# Patient Record
Sex: FEMALE | Race: WHITE | Employment: OTHER | URBAN - METROPOLITAN AREA
[De-identification: names, ages, dates, MRNs, and addresses within clinical notes are randomized per-mention and may not be internally consistent; named-entity substitution may affect disease eponyms.]

---

## 2017-02-05 ENCOUNTER — APPOINTMENT (OUTPATIENT)
Dept: GENERAL RADIOLOGY | Facility: HOSPITAL | Age: 82
End: 2017-02-05
Payer: MEDICARE

## 2017-02-05 ENCOUNTER — HOSPITAL ENCOUNTER (EMERGENCY)
Facility: HOSPITAL | Age: 82
Discharge: HOME | End: 2017-02-05
Payer: MEDICARE

## 2017-02-05 VITALS
WEIGHT: 118 LBS | OXYGEN SATURATION: 96 % | DIASTOLIC BLOOD PRESSURE: 60 MMHG | RESPIRATION RATE: 16 BRPM | HEIGHT: 60 IN | BODY MASS INDEX: 23.16 KG/M2 | SYSTOLIC BLOOD PRESSURE: 134 MMHG | HEART RATE: 65 BPM | TEMPERATURE: 99 F

## 2017-02-05 DIAGNOSIS — S63.502A WRIST SPRAIN, LEFT, INITIAL ENCOUNTER: Primary | ICD-10-CM

## 2017-02-05 DIAGNOSIS — S63.642A SPRAIN OF METACARPOPHALANGEAL (MCP) JOINT OF LEFT THUMB, INITIAL ENCOUNTER: ICD-10-CM

## 2017-02-05 PROCEDURE — 73130 X-RAY EXAM OF HAND: CPT

## 2017-02-05 PROCEDURE — 99283 EMERGENCY DEPT VISIT LOW MDM: CPT

## 2017-02-05 PROCEDURE — 73110 X-RAY EXAM OF WRIST: CPT

## 2017-02-05 NOTE — ED PROVIDER NOTES
Patient Seen in: [facility] AND [facility] Emergency Department    History   Patient presents with:  Musculoskeletal Problem    Stated Complaint: pain and swelling to left hand down l forearm.  injury after removing socks    HPI    70-year-old female brought by olya Neck supple. Cardiovascular: Normal rate, regular rhythm and intact distal pulses. Pulmonary/Chest: Effort normal. No respiratory distress. Abdominal: Soft.   Grossly visually unremarkable exam.  Musculoskeletal: Some mild edema with a small amount o MD on 2/05/2017 at 13:41          2/5/2017  CONCLUSION:  1. No acute fracture or subluxation. 2. 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  3. Additional non-inflammatory osteoarthritis of hand and wr

## 2017-02-05 NOTE — ED NOTES
PT safe to DC home per MD. [place] to dress self. DC teaching done, pt verbalizes understanding. Ambulatory with steady gait to exit.

## 2017-09-05 ENCOUNTER — OFFICE VISIT (OUTPATIENT)
Dept: FAMILY MEDICINE CLINIC | Facility: CLINIC | Age: 82
End: 2017-09-05

## 2017-09-05 VITALS
OXYGEN SATURATION: 95 % | HEART RATE: 80 BPM | TEMPERATURE: 99 F | SYSTOLIC BLOOD PRESSURE: 118 MMHG | RESPIRATION RATE: 18 BRPM | DIASTOLIC BLOOD PRESSURE: 60 MMHG

## 2017-09-05 DIAGNOSIS — J22 ACUTE RESPIRATORY INFECTION: Primary | ICD-10-CM

## 2017-09-05 PROCEDURE — 99202 OFFICE O/P NEW SF 15 MIN: CPT | Performed by: NURSE PRACTITIONER

## 2017-09-05 RX ORDER — AMOXICILLIN AND CLAVULANATE POTASSIUM 875; 125 MG/1; MG/1
1 TABLET, FILM COATED ORAL 2 TIMES DAILY
Qty: 20 TABLET | Refills: 0 | Status: SHIPPED | OUTPATIENT
Start: 2017-09-05 | End: 2017-09-15

## 2017-09-05 NOTE — PATIENT INSTRUCTIONS
What is Pneumonia? Pneumonia is a serious lung infection. Many cases of pneumonia are caused by bacteria or viruses.  Other less common causes include:  · Fungi  · Chemicals  · Gases    You may also get pneumonia after another illness, such as a cold, fl © 5353-3151 [address], [address] [place]. All rights reserved. This information is not intended as a substitute for professional medical care. Always follow your healthcare professional's instructions.

## 2017-09-05 NOTE — PROGRESS NOTES
CHIEF COMPLAINT:   Patient presents with:  Cough        HPI:   Iona Koenig is a 80year old female presents for cough, post nasal drainage, mild headache x3 day duration. Dry cough and laryngitis. Fatigue. No measured fevers.  Per daughter Debi Herrera) sx se Rashmi Sullivan is a 80year old female who presents with:     ASSESSMENT:  Acute respiratory infection  (primary encounter diagnosis)    PLAN:  Meds as below. Comfort Care as listed in Patient Instructions.   Decided to treat based upon presentation, low g What are the Symptoms? Symptoms of pneumonia can come without warning. At first, you may think you have a cold or flu. But symptoms may get worse quickly, turning into pneumonia. Symyptoms can be different for bacterial and viral pneumonia.  Common symptom

## 2017-09-12 ENCOUNTER — TELEPHONE (OUTPATIENT)
Dept: FAMILY MEDICINE CLINIC | Facility: CLINIC | Age: 82
End: 2017-09-12

## 2017-09-12 NOTE — TELEPHONE ENCOUNTER
[address] J[address] visit follow up. Daughter Sang Garcia reports mom is not improving on Augmenting, states still has a cough with otc not helping. Advised to follow up with PCP today or go to ER for further management.   May need chest xray and better monitoring due to her

## 2018-01-01 ENCOUNTER — APPOINTMENT (OUTPATIENT)
Dept: CT IMAGING | Facility: HOSPITAL | Age: 83
DRG: 689 | End: 2018-01-01
Attending: HOSPITALIST
Payer: MEDICARE

## 2018-01-01 ENCOUNTER — APPOINTMENT (OUTPATIENT)
Dept: RADIATION ONCOLOGY | Facility: HOSPITAL | Age: 83
End: 2018-01-01
Attending: RADIOLOGY
Payer: MEDICARE

## 2018-01-01 ENCOUNTER — APPOINTMENT (OUTPATIENT)
Dept: GENERAL RADIOLOGY | Facility: HOSPITAL | Age: 83
DRG: 689 | End: 2018-01-01
Attending: HOSPITALIST
Payer: MEDICARE

## 2018-01-01 ENCOUNTER — APPOINTMENT (OUTPATIENT)
Dept: CV DIAGNOSTICS | Facility: HOSPITAL | Age: 83
DRG: 689 | End: 2018-01-01
Attending: HOSPITALIST
Payer: MEDICARE

## 2018-01-01 ENCOUNTER — HOSPITAL ENCOUNTER (INPATIENT)
Facility: HOSPITAL | Age: 83
LOS: 8 days | Discharge: HOME HEALTH | DRG: 689 | End: 2018-01-01
Attending: EMERGENCY MEDICINE | Admitting: HOSPITALIST
Payer: MEDICARE

## 2018-01-01 ENCOUNTER — TELEPHONE (OUTPATIENT)
Dept: RADIATION ONCOLOGY | Facility: HOSPITAL | Age: 83
End: 2018-01-01

## 2018-01-01 ENCOUNTER — HOSPITAL ENCOUNTER (INPATIENT)
Facility: HOSPITAL | Age: 83
LOS: 7 days | Discharge: HOME HEALTH | DRG: 374 | End: 2018-01-01
Attending: EMERGENCY MEDICINE | Admitting: HOSPITALIST
Payer: MEDICARE

## 2018-01-01 ENCOUNTER — APPOINTMENT (OUTPATIENT)
Dept: CT IMAGING | Facility: HOSPITAL | Age: 83
DRG: 374 | End: 2018-01-01
Attending: EMERGENCY MEDICINE
Payer: MEDICARE

## 2018-01-01 ENCOUNTER — HOSPITAL ENCOUNTER (OUTPATIENT)
Dept: GENERAL RADIOLOGY | Age: 83
Discharge: HOME | End: 2018-01-01
Attending: INTERNAL MEDICINE
Payer: MEDICARE

## 2018-01-01 ENCOUNTER — APPOINTMENT (OUTPATIENT)
Dept: CT IMAGING | Facility: HOSPITAL | Age: 83
DRG: 374 | End: 2018-01-01
Attending: HOSPITALIST
Payer: MEDICARE

## 2018-01-01 ENCOUNTER — ANESTHESIA EVENT (OUTPATIENT)
Dept: ENDOSCOPY | Facility: HOSPITAL | Age: 83
DRG: 374 | End: 2018-01-01
Payer: MEDICARE

## 2018-01-01 ENCOUNTER — DIETICIAN VISIT (OUTPATIENT)
Dept: NUTRITION | Facility: HOSPITAL | Age: 83
End: 2018-01-01

## 2018-01-01 ENCOUNTER — ANESTHESIA (OUTPATIENT)
Dept: ENDOSCOPY | Facility: HOSPITAL | Age: 83
DRG: 374 | End: 2018-01-01
Payer: MEDICARE

## 2018-01-01 ENCOUNTER — TELEPHONE (OUTPATIENT)
Dept: HEMATOLOGY/ONCOLOGY | Facility: HOSPITAL | Age: 83
End: 2018-01-01

## 2018-01-01 ENCOUNTER — APPOINTMENT (OUTPATIENT)
Dept: GENERAL RADIOLOGY | Facility: HOSPITAL | Age: 83
DRG: 689 | End: 2018-01-01
Attending: EMERGENCY MEDICINE
Payer: MEDICARE

## 2018-01-01 VITALS
TEMPERATURE: 98 F | WEIGHT: 105 LBS | HEART RATE: 86 BPM | BODY MASS INDEX: 20.62 KG/M2 | OXYGEN SATURATION: 95 % | HEIGHT: 60 IN | RESPIRATION RATE: 18 BRPM | SYSTOLIC BLOOD PRESSURE: 142 MMHG | DIASTOLIC BLOOD PRESSURE: 61 MMHG

## 2018-01-01 VITALS
WEIGHT: 100.38 LBS | SYSTOLIC BLOOD PRESSURE: 124 MMHG | OXYGEN SATURATION: 96 % | BODY MASS INDEX: 23.23 KG/M2 | HEART RATE: 94 BPM | TEMPERATURE: 100 F | RESPIRATION RATE: 16 BRPM | DIASTOLIC BLOOD PRESSURE: 43 MMHG | HEIGHT: 55 IN

## 2018-01-01 DIAGNOSIS — N30.00 ACUTE CYSTITIS WITHOUT HEMATURIA: Primary | ICD-10-CM

## 2018-01-01 DIAGNOSIS — K63.1 PERFORATION BOWEL (HCC): Primary | ICD-10-CM

## 2018-01-01 DIAGNOSIS — K59.00 CONSTIPATION: ICD-10-CM

## 2018-01-01 DIAGNOSIS — C20 RECTAL CANCER (HCC): Primary | ICD-10-CM

## 2018-01-01 DIAGNOSIS — R55 SYNCOPE, NEAR: ICD-10-CM

## 2018-01-01 DIAGNOSIS — K62.89 RECTAL MASS: ICD-10-CM

## 2018-01-01 PROCEDURE — 99233 SBSQ HOSP IP/OBS HIGH 50: CPT | Performed by: HOSPITALIST

## 2018-01-01 PROCEDURE — 74018 RADEX ABDOMEN 1 VIEW: CPT | Performed by: INTERNAL MEDICINE

## 2018-01-01 PROCEDURE — 77412 RADIATION TX DELIVERY LVL 3: CPT | Performed by: RADIOLOGY

## 2018-01-01 PROCEDURE — 77280 THER RAD SIMULAJ FIELD SMPL: CPT | Performed by: RADIOLOGY

## 2018-01-01 PROCEDURE — 99223 1ST HOSP IP/OBS HIGH 75: CPT | Performed by: HOSPITALIST

## 2018-01-01 PROCEDURE — 93306 TTE W/DOPPLER COMPLETE: CPT | Performed by: HOSPITALIST

## 2018-01-01 PROCEDURE — 70450 CT HEAD/BRAIN W/O DYE: CPT | Performed by: HOSPITALIST

## 2018-01-01 PROCEDURE — 77336 RADIATION PHYSICS CONSULT: CPT | Performed by: RADIOLOGY

## 2018-01-01 PROCEDURE — 71045 X-RAY EXAM CHEST 1 VIEW: CPT | Performed by: HOSPITALIST

## 2018-01-01 PROCEDURE — 77334 RADIATION TREATMENT AID(S): CPT | Performed by: RADIOLOGY

## 2018-01-01 PROCEDURE — 99232 SBSQ HOSP IP/OBS MODERATE 35: CPT | Performed by: INTERNAL MEDICINE

## 2018-01-01 PROCEDURE — 77387 GUIDANCE FOR RADJ TX DLVR: CPT | Performed by: RADIOLOGY

## 2018-01-01 PROCEDURE — 71260 CT THORAX DX C+: CPT | Performed by: HOSPITALIST

## 2018-01-01 PROCEDURE — 77331 SPECIAL RADIATION DOSIMETRY: CPT | Performed by: RADIOLOGY

## 2018-01-01 PROCEDURE — 74177 CT ABD & PELVIS W/CONTRAST: CPT | Performed by: EMERGENCY MEDICINE

## 2018-01-01 PROCEDURE — 77290 THER RAD SIMULAJ FIELD CPLX: CPT | Performed by: RADIOLOGY

## 2018-01-01 PROCEDURE — 77295 3-D RADIOTHERAPY PLAN: CPT | Performed by: RADIOLOGY

## 2018-01-01 PROCEDURE — 99222 1ST HOSP IP/OBS MODERATE 55: CPT | Performed by: INTERNAL MEDICINE

## 2018-01-01 PROCEDURE — 99239 HOSP IP/OBS DSCHRG MGMT >30: CPT | Performed by: HOSPITALIST

## 2018-01-01 PROCEDURE — 71045 X-RAY EXAM CHEST 1 VIEW: CPT | Performed by: EMERGENCY MEDICINE

## 2018-01-01 PROCEDURE — 99223 1ST HOSP IP/OBS HIGH 75: CPT | Performed by: OTHER

## 2018-01-01 PROCEDURE — 99232 SBSQ HOSP IP/OBS MODERATE 35: CPT | Performed by: HOSPITALIST

## 2018-01-01 PROCEDURE — 99231 SBSQ HOSP IP/OBS SF/LOW 25: CPT | Performed by: OTHER

## 2018-01-01 PROCEDURE — 45331 SIGMOIDOSCOPY AND BIOPSY: CPT | Performed by: INTERNAL MEDICINE

## 2018-01-01 PROCEDURE — 77300 RADIATION THERAPY DOSE PLAN: CPT | Performed by: RADIOLOGY

## 2018-01-01 PROCEDURE — 0DBP8ZX EXCISION OF RECTUM, VIA NATURAL OR ARTIFICIAL OPENING ENDOSCOPIC, DIAGNOSTIC: ICD-10-PCS | Performed by: INTERNAL MEDICINE

## 2018-01-01 RX ORDER — BISACODYL 10 MG
10 SUPPOSITORY, RECTAL RECTAL
Status: DISCONTINUED | OUTPATIENT
Start: 2018-01-01 | End: 2018-01-01

## 2018-01-01 RX ORDER — MECLIZINE HCL 12.5 MG/1
12.5 TABLET ORAL 3 TIMES DAILY
Status: DISCONTINUED | OUTPATIENT
Start: 2018-01-01 | End: 2018-01-01

## 2018-01-01 RX ORDER — SODIUM CHLORIDE, SODIUM LACTATE, POTASSIUM CHLORIDE, CALCIUM CHLORIDE 600; 310; 30; 20 MG/100ML; MG/100ML; MG/100ML; MG/100ML
INJECTION, SOLUTION INTRAVENOUS CONTINUOUS
Status: DISCONTINUED | OUTPATIENT
Start: 2018-01-01 | End: 2018-01-01

## 2018-01-01 RX ORDER — CHOLECALCIFEROL (VITAMIN D3) 125 MCG
1000 CAPSULE ORAL DAILY
Status: DISCONTINUED | OUTPATIENT
Start: 2018-01-01 | End: 2018-01-01

## 2018-01-01 RX ORDER — HYDROCODONE BITARTRATE AND ACETAMINOPHEN 5; 325 MG/1; MG/1
1 TABLET ORAL EVERY 4 HOURS PRN
Status: DISCONTINUED | OUTPATIENT
Start: 2018-01-01 | End: 2018-01-01

## 2018-01-01 RX ORDER — SODIUM CHLORIDE 9 MG/ML
INJECTION, SOLUTION INTRAVENOUS
Status: COMPLETED
Start: 2018-01-01 | End: 2018-01-01

## 2018-01-01 RX ORDER — MAGNESIUM OXIDE 400 MG (241.3 MG MAGNESIUM) TABLET
400 TABLET ONCE
Status: COMPLETED | OUTPATIENT
Start: 2018-01-01 | End: 2018-01-01

## 2018-01-01 RX ORDER — NITROGLYCERIN 0.4 MG/1
0.4 TABLET SUBLINGUAL EVERY 5 MIN PRN
Status: DISCONTINUED | OUTPATIENT
Start: 2018-01-01 | End: 2018-01-01

## 2018-01-01 RX ORDER — HYDROCODONE BITARTRATE AND ACETAMINOPHEN 5; 325 MG/1; MG/1
2 TABLET ORAL EVERY 4 HOURS PRN
Status: DISCONTINUED | OUTPATIENT
Start: 2018-01-01 | End: 2018-01-01

## 2018-01-01 RX ORDER — SODIUM CHLORIDE 9 MG/ML
INJECTION, SOLUTION INTRAVENOUS CONTINUOUS
Status: CANCELLED | OUTPATIENT
Start: 2018-01-01

## 2018-01-01 RX ORDER — ONDANSETRON 2 MG/ML
4 INJECTION INTRAMUSCULAR; INTRAVENOUS EVERY 6 HOURS PRN
Status: DISCONTINUED | OUTPATIENT
Start: 2018-01-01 | End: 2018-01-01

## 2018-01-01 RX ORDER — MELATONIN
325
Qty: 30 TABLET | Refills: 0 | Status: SHIPPED | OUTPATIENT
Start: 2018-01-01

## 2018-01-01 RX ORDER — HEPARIN SODIUM 5000 [USP'U]/ML
5000 INJECTION, SOLUTION INTRAVENOUS; SUBCUTANEOUS EVERY 8 HOURS SCHEDULED
Status: DISCONTINUED | OUTPATIENT
Start: 2018-01-01 | End: 2018-01-01

## 2018-01-01 RX ORDER — MORPHINE SULFATE 4 MG/ML
2 INJECTION, SOLUTION INTRAMUSCULAR; INTRAVENOUS EVERY 2 HOUR PRN
Status: DISCONTINUED | OUTPATIENT
Start: 2018-01-01 | End: 2018-01-01

## 2018-01-01 RX ORDER — DEXTROSE, SODIUM CHLORIDE, AND POTASSIUM CHLORIDE 5; .45; .15 G/100ML; G/100ML; G/100ML
INJECTION INTRAVENOUS CONTINUOUS
Status: DISCONTINUED | OUTPATIENT
Start: 2018-01-01 | End: 2018-01-01

## 2018-01-01 RX ORDER — ACETAMINOPHEN 325 MG/1
650 TABLET ORAL EVERY 4 HOURS PRN
Status: DISCONTINUED | OUTPATIENT
Start: 2018-01-01 | End: 2018-01-01

## 2018-01-01 RX ORDER — SODIUM CHLORIDE 9 MG/ML
INJECTION, SOLUTION INTRAVENOUS CONTINUOUS
Status: DISCONTINUED | OUTPATIENT
Start: 2018-01-01 | End: 2018-01-01

## 2018-01-01 RX ORDER — FUROSEMIDE 10 MG/ML
40 INJECTION INTRAMUSCULAR; INTRAVENOUS ONCE
Status: COMPLETED | OUTPATIENT
Start: 2018-01-01 | End: 2018-01-01

## 2018-01-01 RX ORDER — AZITHROMYCIN 250 MG/1
500 TABLET, FILM COATED ORAL
Status: COMPLETED | OUTPATIENT
Start: 2018-01-01 | End: 2018-01-01

## 2018-01-01 RX ORDER — ACETAMINOPHEN 325 MG/1
650 TABLET ORAL EVERY 6 HOURS PRN
Status: DISCONTINUED | OUTPATIENT
Start: 2018-01-01 | End: 2018-01-01

## 2018-01-01 RX ORDER — IPRATROPIUM BROMIDE AND ALBUTEROL SULFATE 2.5; .5 MG/3ML; MG/3ML
3 SOLUTION RESPIRATORY (INHALATION) EVERY 6 HOURS PRN
Status: DISCONTINUED | OUTPATIENT
Start: 2018-01-01 | End: 2018-01-01

## 2018-01-01 RX ORDER — METOCLOPRAMIDE HYDROCHLORIDE 5 MG/ML
10 INJECTION INTRAMUSCULAR; INTRAVENOUS EVERY 8 HOURS PRN
Status: DISCONTINUED | OUTPATIENT
Start: 2018-01-01 | End: 2018-01-01

## 2018-01-01 RX ORDER — POTASSIUM CHLORIDE 20 MEQ/1
40 TABLET, EXTENDED RELEASE ORAL EVERY 4 HOURS
Status: DISCONTINUED | OUTPATIENT
Start: 2018-01-01 | End: 2018-01-01

## 2018-01-01 RX ORDER — MAGNESIUM SULFATE 1 G/100ML
1 INJECTION INTRAVENOUS ONCE
Status: COMPLETED | OUTPATIENT
Start: 2018-01-01 | End: 2018-01-01

## 2018-01-01 RX ORDER — MAGNESIUM OXIDE 400 MG (241.3 MG MAGNESIUM) TABLET
800 TABLET ONCE
Status: COMPLETED | OUTPATIENT
Start: 2018-01-01 | End: 2018-01-01

## 2018-01-01 RX ORDER — LIDOCAINE HYDROCHLORIDE 10 MG/ML
INJECTION, SOLUTION EPIDURAL; INFILTRATION; INTRACAUDAL; PERINEURAL AS NEEDED
Status: DISCONTINUED | OUTPATIENT
Start: 2018-01-01 | End: 2018-01-01 | Stop reason: SURG

## 2018-01-01 RX ORDER — 0.9 % SODIUM CHLORIDE 0.9 %
VIAL (ML) INJECTION
Status: COMPLETED
Start: 2018-01-01 | End: 2018-01-01

## 2018-01-01 RX ORDER — SPIRONOLACTONE 25 MG/1
12.5 TABLET ORAL DAILY
Qty: 30 TABLET | Refills: 0 | Status: SHIPPED | OUTPATIENT
Start: 2018-01-01

## 2018-01-01 RX ORDER — CIPROFLOXACIN 250 MG/1
250 TABLET, FILM COATED ORAL 2 TIMES DAILY
Qty: 6 TABLET | Refills: 0 | Status: SHIPPED | OUTPATIENT
Start: 2018-01-01 | End: 2018-01-01

## 2018-01-01 RX ORDER — ACETAMINOPHEN 160 MG
2000 TABLET,DISINTEGRATING ORAL DAILY
COMMUNITY

## 2018-01-01 RX ORDER — SODIUM CHLORIDE 0.9 % (FLUSH) 0.9 %
3 SYRINGE (ML) INJECTION AS NEEDED
Status: DISCONTINUED | OUTPATIENT
Start: 2018-01-01 | End: 2018-01-01

## 2018-01-01 RX ORDER — POTASSIUM CHLORIDE 20 MEQ/1
40 TABLET, EXTENDED RELEASE ORAL ONCE
Status: COMPLETED | OUTPATIENT
Start: 2018-01-01 | End: 2018-01-01

## 2018-01-01 RX ORDER — POTASSIUM CHLORIDE 20 MEQ/1
40 TABLET, EXTENDED RELEASE ORAL EVERY 4 HOURS
Status: COMPLETED | OUTPATIENT
Start: 2018-01-01 | End: 2018-01-01

## 2018-01-01 RX ORDER — SPIRONOLACTONE 25 MG/1
12.5 TABLET ORAL DAILY
Status: DISCONTINUED | OUTPATIENT
Start: 2018-01-01 | End: 2018-01-01

## 2018-01-01 RX ORDER — AZITHROMYCIN 250 MG/1
500 TABLET, FILM COATED ORAL
Status: DISCONTINUED | OUTPATIENT
Start: 2018-01-01 | End: 2018-01-01

## 2018-01-01 RX ORDER — HEPARIN SODIUM 5000 [USP'U]/ML
5000 INJECTION, SOLUTION INTRAVENOUS; SUBCUTANEOUS EVERY 12 HOURS SCHEDULED
Status: DISCONTINUED | OUTPATIENT
Start: 2018-01-01 | End: 2018-01-01

## 2018-01-01 RX ORDER — MORPHINE SULFATE 4 MG/ML
4 INJECTION, SOLUTION INTRAMUSCULAR; INTRAVENOUS EVERY 2 HOUR PRN
Status: DISCONTINUED | OUTPATIENT
Start: 2018-01-01 | End: 2018-01-01

## 2018-01-01 RX ORDER — FUROSEMIDE 20 MG/1
20 TABLET ORAL DAILY
Status: DISCONTINUED | OUTPATIENT
Start: 2018-01-01 | End: 2018-01-01

## 2018-01-01 RX ORDER — MORPHINE SULFATE 4 MG/ML
1 INJECTION, SOLUTION INTRAMUSCULAR; INTRAVENOUS EVERY 2 HOUR PRN
Status: DISCONTINUED | OUTPATIENT
Start: 2018-01-01 | End: 2018-01-01

## 2018-01-01 RX ORDER — ARIPIPRAZOLE 15 MG/1
40 TABLET ORAL ONCE
Status: DISCONTINUED | OUTPATIENT
Start: 2018-01-01 | End: 2018-01-01

## 2018-01-01 RX ORDER — MECLIZINE HCL 12.5 MG/1
12.5 TABLET ORAL 3 TIMES DAILY
Qty: 30 TABLET | Refills: 0 | Status: SHIPPED | OUTPATIENT
Start: 2018-01-01

## 2018-01-01 RX ORDER — SODIUM CHLORIDE, SODIUM LACTATE, POTASSIUM CHLORIDE, CALCIUM CHLORIDE 600; 310; 30; 20 MG/100ML; MG/100ML; MG/100ML; MG/100ML
INJECTION, SOLUTION INTRAVENOUS CONTINUOUS PRN
Status: DISCONTINUED | OUTPATIENT
Start: 2018-01-01 | End: 2018-01-01 | Stop reason: SURG

## 2018-01-01 RX ORDER — METOCLOPRAMIDE HYDROCHLORIDE 5 MG/ML
5 INJECTION INTRAMUSCULAR; INTRAVENOUS EVERY 8 HOURS PRN
Status: DISCONTINUED | OUTPATIENT
Start: 2018-01-01 | End: 2018-01-01

## 2018-01-01 RX ORDER — NALOXONE HYDROCHLORIDE 0.4 MG/ML
80 INJECTION, SOLUTION INTRAMUSCULAR; INTRAVENOUS; SUBCUTANEOUS AS NEEDED
Status: DISCONTINUED | OUTPATIENT
Start: 2018-01-01 | End: 2018-01-01 | Stop reason: HOSPADM

## 2018-01-01 RX ORDER — DOCUSATE SODIUM 100 MG/1
100 CAPSULE, LIQUID FILLED ORAL 2 TIMES DAILY
Status: DISCONTINUED | OUTPATIENT
Start: 2018-01-01 | End: 2018-01-01

## 2018-01-01 RX ADMIN — SODIUM CHLORIDE, SODIUM LACTATE, POTASSIUM CHLORIDE, CALCIUM CHLORIDE: 600; 310; 30; 20 INJECTION, SOLUTION INTRAVENOUS at 10:00:00

## 2018-01-01 RX ADMIN — SODIUM CHLORIDE, SODIUM LACTATE, POTASSIUM CHLORIDE, CALCIUM CHLORIDE: 600; 310; 30; 20 INJECTION, SOLUTION INTRAVENOUS at 09:53:00

## 2018-01-01 RX ADMIN — LIDOCAINE HYDROCHLORIDE 25 MG: 10 INJECTION, SOLUTION EPIDURAL; INFILTRATION; INTRACAUDAL; PERINEURAL at 10:01:00

## 2018-10-26 PROBLEM — K63.1 BOWEL PERFORATION (HCC): Status: ACTIVE | Noted: 2018-01-01

## 2018-10-26 PROBLEM — K63.1 PERFORATION BOWEL (HCC): Status: ACTIVE | Noted: 2018-01-01

## 2018-10-26 PROBLEM — K62.89 RECTAL MASS: Status: ACTIVE | Noted: 2018-01-01

## 2018-10-26 NOTE — ED NOTES
Pt to and from ct without incident  Stable at this time  Fluids started, see mar  Will continue to monitor

## 2018-10-26 NOTE — CONSULTS
GI CONSULTATION:  Available medical records reviewed. Patient interviewed and examined with the family. Please see orders and transcription. ( Dictated [id number] ). Initial plan will be for flexible sigmoidoscopy, limited for biopsies in the morning.   Tap

## 2018-10-26 NOTE — CONSULTS
[facility]    Report of Consultation    Migueleileen Jerezcelia Patient Status:  Inpatient    1926 MRN [id number]   Location [facility] 4W/SW/SE Attending Jo Gonzalez MD   Hosp Day # 0 PCP Marck Garcia     Date of Admission:  1 Intravenous, Q2H PRN  •  ondansetron HCl (ZOFRAN) injection 4 mg, 4 mg, Intravenous, Q6H PRN  •  docusate sodium (COLACE) cap 100 mg, 100 mg, Oral, BID  •  bisacodyl (DULCOLAX) rectal suppository 10 mg, 10 mg, Rectal, Daily PRN  •  dextrose 5 % and 0.45 % CONSIDERING POSSIBLE SURGICAL INTERVENTION  WOULD OBTAIN GYNE-ONC EVAL AS MAY BE OVARIAN IN ORIGIN  WOULD HAVE GI SEE PT AND CONSIDER LOWER ENDOSCOPY TO EVALUATE COLONIC MUCOSA (\ie colonic origin vs primary peritoneal/ovarian)  OBTAIN TUMOR MARKERS  PT'S

## 2018-10-26 NOTE — ED PROVIDER NOTES
Patient Seen in: [facility] AND [facility] Emergency Department    History   Patient presents with:  Abdomen/Flank Pain (GI/)    Stated Complaint: bowel obstruction    HPI    19-year-old female presents for complaint of constipation and abdominal pain for the O2 Device 10/26/18 0984 None (Room air)       Current:/59 (BP Location: Right arm)   Pulse 80   Temp 98.6 °F (37 °C) (Oral)   Resp 18   Ht 152.4 cm (5')   Wt 47.6 kg   SpO2 97%   BMI 20.51 kg/m²         Physical Exam   Constitutional: She is oriented CBC W/ DIFFERENTIAL - Abnormal; Notable for the following components:    RBC 2.92 (*)     HGB 9.2 (*)     HCT 28.2 (*)     RDW 18.4 (*)     Monocyte Absolute 1.3 (*)     All other components within normal limits   LIPASE - Normal   CBC WITH DIFFERENTIAL WI PROCEDURE: CT ABDOMEN PELVIS IV CONTRAST NO ORAL (ER)  COMPARISON: None. INDICATIONS: Diffuse abdominal pain and distention with intermittent diarrhea and constipation.   TECHNIQUE: CT images of the abdomen and pelvis were obtained with non-ionic intraveno lesion measuring approximately 7.5 x 4.8 x 7.3 cm with multiple internal foci of gas. There is marked thickening of the distal sigmoid colon and rectum. This mass lesion completely invades and obliterates the ventral wall of the distal colon and rectum.  Guido Dye CONCLUSION:   Large necrotic heterogeneous mass within the pelvis with invasion of the vascular cuff and rectum. Differential include a necrotic cervical cancer or necrotic colon cancer with adjacent contained perforation.  No definite extraluminal or drain There are no discharge medications for this patient.       Present on Admission  Date Reviewed: 9/5/2017          ICD-10-CM Noted POA    * (Principal) Perforation bowel ([address] K63.1 10/26/2018 Unknown    Bowel perforation ([facility] [address] K63.1 10/26/2018 Unknown    Re

## 2018-10-26 NOTE — ED INITIAL ASSESSMENT (HPI)
C/o per family patient was dx with bronchitis one month ago, was given codeine and was complaining BMs ranging from diarrhea to constipation, for one week patient has noticed her abd is distended and \"hard\"

## 2018-10-26 NOTE — H&P
[facility]    PATIENT'S NAME: Sanaz Yost   ATTENDING PHYSICIAN: Piotr Verdugo MD   PATIENT ACCOUNT#:   [id number]    LOCATION:  [place] [address]  MEDICAL RECORD #:   [id number]       YOB: 1926  ADMISSION DATE:       10/26/20 EXAMINATION:    GENERAL:   Alert and oriented to time, place, and person. Moderate distress. VITAL SIGNS:  Temperature 98.0, pulse 82, respiratory rate 20, blood pressure 126/60, pulse ox 97% on room air. HEENT:  Atraumatic. Oropharynx clear.   Dry m

## 2018-10-26 NOTE — ED NOTES
Received pt a/ox3, clear speech, nad, no resp distress  Here with complaint of generalized abd pain and diarrhea x 2 weeks.  Family reports symptoms came on after taking abx for bronchitis  Upon assessment, bulging noted in LLQ, pt denies pain or tenderness

## 2018-10-27 NOTE — OPERATIVE REPORT
[facility]    PATIENT'S NAME: Miteshrimargayumiko Londonomar   ATTENDING PHYSICIAN: Andrés Coronado MD   OPERATING PHYSICIAN: Verna Gomez MD   PATIENT ACCOUNT#:   [id number]    LOCATION:  [address] [place] [address] RECORD #:   [id number] mass.  The patient tolerated the procedure well, without immediate complication. Withdrawal of the scope from the anal canal did show small internal hemorrhoids. IMPRESSION:    1. Large rectal mass, rule out malignancy. 2.   Internal hemorrhoids.

## 2018-10-27 NOTE — ANESTHESIA PREPROCEDURE EVALUATION
Anesthesia PreOp Note    HPI:     Saumya Clinton is a 80year old female who presents for preoperative consultation requested by: Jorge Victoria MD    Date of Surgery: 10/26/2018 - 10/27/2018    Procedure(s):   FLEXIBLE SIGMOIDOSCOPY  Indicatio injection 4 mg 4 mg Intravenous Q2H PRN Marilia Dennis MD     ondansetron HCl (ZOFRAN) injection 4 mg 4 mg Intravenous Q6H PRN Marilia Dennis MD     docusate sodium (COLACE) cap 100 mg 100 mg Oral BID Marilia Dennis  mg at 10/26/18 9566    bisa 10/27/2018          Vital Signs: Body mass index is 20.51 kg/m². height is 1.524 m (5') and weight is 47.6 kg (105 lb). Her oral temperature is 98.5 °F (36.9 °C). Her blood pressure is 125/50 and her pulse is 86.  Her respiration is 23 and oxygen saturat

## 2018-10-27 NOTE — ANESTHESIA POSTPROCEDURE EVALUATION
Patient:  Marianna Vargas    Procedure Summary     Date:  10/27/18 Room / Location:  [address] [place] [address] ENDOSCOPY 01 / 300 [facility] ENDOSCOPY    Anesthesia Start:  8223 Anesthesia Stop:  4056    Procedure:  FLEXIBLE SIGMOIDOSCOPY (N/A ) Diagnosis:  (Rectal mass, hemorrhoids)    Gerardo

## 2018-10-27 NOTE — H&P
History & Physical Examination    Patient Name: Justin Dodson  MRN: [id number]  [facility]: [id number]  YOB: 1926    Diagnosis: Rectal mass      Medications Prior to Admission:  Cyanocobalamin (VITAMIN B 12 OR) Take 1,000 mg by mouth daily.  Disp: the procedure with the patient/family. They understand and agree to proceed with plan of care. I have reviewed the History and Physical done within the last 30 days. Any changes noted above.   Anshul Navarro MD  [facility]

## 2018-10-27 NOTE — PROGRESS NOTES
[facility]    Progress Note    Giancarlo Mendieta Patient Status:  Inpatient    1926 MRN [id number]   Location [facility] 4W/SW/SE Attending Roosvelt Habermann, MD   Hosp Day # 1 PCP Carol Matos       Subjective:    Giancarlo Mendieta ondansetron HCl, bisacodyl    Results:     Lab Results   Component Value Date    WBC 8.5 10/27/2018    HGB 8.4 (L) 10/27/2018    HCT 25.4 (L) 10/27/2018     10/27/2018    CREATSERUM 0.91 10/27/2018    BUN 8 10/27/2018     (L) 10/27/2018    K 4 Large left inguinal hernia containing loop of colon. No obstruction. Chronic appearing left UPJ obstruction. Nonobstructing left renal calculus. Multiple other incidental findings as described in the body of the report.   This report was communicated by

## 2018-10-27 NOTE — PLAN OF CARE
Patient Centered Care    • Patient preferences are identified and integrated in the patient's plan of care Progressing        Patient/Family Goals    • Patient/Family Long Term Goal Progressing    • Patient/Family Short Term Goal Progressing        Alice bowers

## 2018-10-27 NOTE — BRIEF OP NOTE
Pre-Operative Diagnosis: rectal mass     Post-Operative Diagnosis: Rectal mass, internal hemorrhoids     Procedure Performed:   Procedure(s):  flexible sigmoidoscopy with biopsies    Surgeon(s) and Role:     * Tanner Bauer MD - Primary    As

## 2018-10-27 NOTE — CONSULTS
[facility] [place]    PATIENT'S NAME: Oval Jessica   ATTENDING PHYSICIAN: Ginna Khanna MD   CONSULTING PHYSICIAN: Olman Patel MD   PATIENT ACCOUNT#:   [id number]    LOCATION:  [address] [place] [address]  RECORD #:   [id number]       D which I do not have results at this time, which according to family, were determined as showing no acute changes. The patient has no prior history of colonoscopy. She denies a family history of colon cancer.   She states that her father  of head and n Nondistended, soft. Well-healed lower abdominal scar post hysterectomy. No definite mass or hepatosplenomegaly appreciated. EXTREMITIES:  Without edema, cyanosis, clubbing. NEUROLOGIC:  Oriented x3. No gross motor deficits.   PSYCHIATRIC:  Normal aff diagnosis. This can be scheduled for tomorrow with a tap-water enema proceeding. Possibility of surgical intervention is being considered. 2.   Clear liquid diet tonight, tap-water enema in the morning, flexible sigmoidoscopy tomorrow.   Surgical consu

## 2018-10-27 NOTE — PROGRESS NOTES
[facility]    Progress Note    Josseline Christensen Patient Status:  Inpatient    1926 MRN [id number]   Location [facility] 4W/SW/SE Attending Bridget Heart MD   Hosp Day # 1 PCP EMA ZUÑIGA     Subjective:  I feel ok  Some p care:  29343- 55 min  Total time spent with patient:  1 Hour 15 Minutes    FRANC RIZZO  10/27/2018  7:24 AM

## 2018-10-28 NOTE — PROGRESS NOTES
[facility]    Progress Note    Marilyn Fuentes Patient Status:  Inpatient    1926 MRN [id number]   Location [facility] 4W/SW/SE Attending Amirah Anaya MD   Hosp Day # 2 PCP Rico Jo       Subjective:    Marilyn Fuentes acetaminophen **OR** HYDROcodone-acetaminophen **OR** HYDROcodone-acetaminophen, morphINE sulfate **OR** morphINE sulfate **OR** morphINE sulfate, ondansetron HCl, bisacodyl    Results:     Lab Results   Component Value Date    WBC 8.9 10/28/2018    HGB 8. with adjacent contained perforation. No definite extraluminal or drainable collection is seen. No pneumoperitoneum. The mass narrows the colonic and rectal lumen without obstruction.   Reactive wall thickening of the posterior wall of the bladder, likely r

## 2018-10-28 NOTE — PROGRESS NOTES
[facility]     Gastroenterology Progress Note    Lorri Desaistein Patient Status:  Inpatient    1926 MRN [id number]   Location [facility] 4W/SW/SE Attending Pratima Catalan MD   Hosp Day # 2 PCP Marlena El       As in stool. Negative for heartburn, vomiting, abdominal pain, diarrhea, constipation, abdominal distention, anal bleeding and rectal pain. Endocrine: Negative for cold intolerance and heat intolerance. Genitourinary: Positive for frequency.  Negative for deviation present. No thyromegaly present. Cardiovascular: Normal rate, regular rhythm, normal heart sounds and intact distal pulses. Exam reveals no gallop and no friction rub. No murmur heard. Edema not present.   Pulmonary/Chest: Effort normal an No Oral (er)    Result Date: 10/26/2018  CONCLUSION:   Large necrotic heterogeneous mass within the pelvis with invasion of the vascular cuff and rectum.  Differential include a necrotic cervical cancer or necrotic colon cancer with adjacent contained perfo

## 2018-10-28 NOTE — PROGRESS NOTES
[facility]    Progress Note    Anushka Reed Patient Status:  Inpatient    1926 MRN [id number]   Location [facility] 4W/SW/SE Attending Charmayne Cranker, MD   Hosp Day # 2 PCP EMA ZUÑIGA     Subjective:  I feel well  No care:  66009- 55 min  Total time spent with patient:  1 Hour    FRANC RIZOZ  10/28/2018  8:34 AM

## 2018-10-28 NOTE — PLAN OF CARE
Problem: Patient Centered Care  Goal: Patient preferences are identified and integrated in the patient's plan of care  Interventions:    - Provide timely, complete, and accurate information to patient/family  - Incorporate patient and family knowledge, bita

## 2018-10-29 NOTE — DIETARY NOTE
ADULT NUTRITION INITIAL ASSESSMENT    Pt is at moderate nutrition risk. Pt meets malnutrition criteria.       CRITERIA FOR MALNUTRITION DIAGNOSIS:  Criteria for non-severe malnutrition diagnosis: acute illness/injury related to wt loss 7.5% in 3 months, providers    - Discharge and transfer of nutrition care to new setting or provider: monitor plans    ADMITTING DIAGNOSIS:   Perforation bowel ([facility] [address] [K63.1]  Rectal mass [K62.9]    Past Medical History   has no past medical history on file.     ANTHROPOMETRI Fiber/Soft  Oral Supplements: 3 times daily as above  Estimated Nutritional Needs:  Calories: 1430 calories/day (30 calories per kg Current wt)  Protein: 55-65 grams protein/day (1.2-1.4 grams protein per kg Current wt)    MONITOR AND EVALUATE/NUTRITION GO

## 2018-10-29 NOTE — PROGRESS NOTES
[facility]    Progress Note    Iona Mail Patient Status:  Inpatient    1926 MRN [id number]   Location [facility] 4W/SW/SE Attending Diana Gill MD   Hosp Day # 3 PCP Juventino Carver       Subjective:    Iona Mail HYDROcodone-acetaminophen, morphINE sulfate **OR** morphINE sulfate **OR** morphINE sulfate, ondansetron HCl, bisacodyl    Results:     Lab Results   Component Value Date    WBC 8.2 10/29/2018    HGB 7.8 (L) 10/29/2018    HCT 23.8 (L) 10/29/2018     are present. 2. Large compound hiatal hernia with partial intrathoracic herniation of the stomach and nonobstructing loops of transverse colon. No gastric volvulus mass or obstruction. No additional mediastinal or hilar mass/adenopathy.  No CT evidence of p will reasonably be expected to span two midnight's based on the clinical documentation in H+P. Based on patients current state of illness, I anticipate that, after discharge, patient will require TBD.

## 2018-10-29 NOTE — CONSULTS
IP consult to Oncology Once  Consult performed by: Kiran Whitlock MD  Consult ordered by: Kiran Whitlock MD  Reason for consult: metastatic colorectal cancer          [facility]    Report of Hematology/Oncology Consultation    Ela Tyler Given that the symptoms were progressive the patient's daughters brought her to the emergency department on 10/26/2018.   Patient had a CT scan of the abdomen and pelvis performed with IV contrast without oral contrast.  This was consistent with a [facility] Current Outpatient Medications on File Prior to Encounter:  Cyanocobalamin (VITAMIN B 12 OR) Take 1,000 mg by mouth daily. Disp:  Rfl:    Vitamin D3 2000 units Oral Cap Take 2,000 Units by mouth daily.  Disp:  Rfl:      • iron sucrose  200 mg Intravenous Da Eyes: No scleral icterus. Cardiovascular: Normal rate, regular rhythm and normal heart sounds. No murmur heard. Edema not present. Pulmonary/Chest: Effort normal and breath sounds normal.   Abdominal: Soft.  Bowel sounds are normal. She exhibits no CONCLUSION:  1. Borderline/mild cardiomegaly. No pericardial thickening or effusion. Mitral and aortic annular and coronary artery calcifications are present.  2. Large compound hiatal hernia with partial intrathoracic herniation of the stomach and nonobstr Discussed with the patient and her family the natural progression of the disease, and that based on the tumor location she is at very high risk of obstruction.   Discussed palliative options for the obstruction including a diverting loop colostomy, radiatio

## 2018-10-29 NOTE — PROGRESS NOTES
DHARA FEELS WELL THIS MORNING  WANTS TO GO HOME  HAS MADE UP HER MIND NOT TO HAVE ANY SURGERY  DENIES ABDOMINAL PAIN  MOVING HER BOWELS  ABDOMEN IS SOFT, NONTENDER  GOOD BOWEL SOUNDS    PLAN:  DR ESPINOSA TO SEE AT REQUEST OF PT'S DAUGHTER              NO SURGER

## 2018-10-29 NOTE — PROGRESS NOTES
[facility]     Gastroenterology Progress Note    Rosanne Chiu Patient Status:  Inpatient    1926 MRN [id number]   Location [facility] 4W/SW/SE Attending Rosa Isaac MD   Hosp Day # 3 PCP Carline Archibald       As abdominal distention, anal bleeding and rectal pain. Endocrine: Negative for cold intolerance and heat intolerance. Genitourinary: Positive for frequency. Negative for dysuria, urgency, hematuria, flank pain, difficulty urinating and pelvic pain.    Madeline Sainz breath sounds normal. No stridor. No respiratory distress. She has no wheezes. She has no rales. She exhibits no tenderness. Abdominal: Soft.  Normal appearance and bowel sounds are normal. She exhibits no distension, no fluid wave, no ascites and no mass annular and coronary artery calcifications are present. 2. Large compound hiatal hernia with partial intrathoracic herniation of the stomach and nonobstructing loops of transverse colon. No gastric volvulus mass or obstruction.  No additional mediastinal or

## 2018-10-30 PROBLEM — C20 RECTAL CANCER (HCC): Status: ACTIVE | Noted: 2018-01-01

## 2018-10-30 PROBLEM — E46 MALNUTRITION (HCC): Status: ACTIVE | Noted: 2018-01-01

## 2018-10-30 NOTE — PLAN OF CARE
DISCHARGE PLANNING    • Discharge to home or other facility with appropriate resources Progressing    Planning for discharge tomorrow, potentially with [facility].      PAIN - ADULT    • Verbalizes/displays adequate comfort level or patient's stated pain goal Prog

## 2018-10-30 NOTE — HOME CARE LIAISON
Met with patient and daughters Yeison Spaulding at the bedside. Patient is agreeable to [facility]. Brochure and liaison's card provided with contact information. All questions addressed and answered.

## 2018-10-30 NOTE — CM/SW NOTE
10/30- MD orders received in regards to discharge planning- [facility]. The Patient and her daughter Arthur Castillo ([phone]) and Roderick Colbert were seen at bedside. The Patient resides alone in [place] in a ranch home.    Prior to hospitalization, the patient wasn't

## 2018-10-30 NOTE — PROGRESS NOTES
[facility]    Progress Note    Melissa Isidro Patient Status:  Inpatient    1926 MRN [id number]   Location [facility] 4W/SW/SE Attending Giovanni Mcclendon MD   Hosp Day # 4 PCP James Kumar       Subjective:    Melissa Isidro Current PRN Inpatient Meds:      Normal Saline Flush, acetaminophen, acetaminophen **OR** HYDROcodone-acetaminophen **OR** HYDROcodone-acetaminophen, morphINE sulfate **OR** morphINE sulfate **OR** morphINE sulfate, ondansetron HCl, bisacodyl    Resu Culture No Growth at 18-24 hrs.  N/A       Imaging/EKG:           Assessment and Plan:   Rectal mass / adenocarcinoma  -s/p flex sig with biopsies  -tolerated CLD - advanced to low fiber  -rectal cancer with mets to liver  -CT chest with ?nodule  -GI and Pelaez

## 2018-10-31 NOTE — PHYSICAL THERAPY NOTE
PHYSICAL THERAPY EVALUATION - INPATIENT     Room Number: 447B/447-B  Evaluation Date: 10/31/2018  Type of Evaluation: Initial   Physician Order: PT Eval and Treat    Presenting Problem: adenocarcinoma  metastatic rectal CA   perforated bowel     weakness assist overall for lines , pt steadying and cues. Pt returned to chair in room . Family present, declining use of chair alarm . Pt left up in chair , needs within reach , report to RN . Initially family in room and pt with goal for d/c to home. female with a recent diagnosis of an adenocarcinoma of the rectum. She had been doing quite well and living independently up until about 1 month ago. At that time, she began having some symptoms of an upper respiratory infection.   On further questioning, radiotherapy. IMPRESSION:  This is a 54-year-old female with a recent diagnosis of metastatic and locally advanced colorectal malignancy.   She is not a surgical or chemotherapeutic candidate based upon her advanced age.     RECOMMENDATIONS:  I do lopez there should be any questions regarding the radiotherapy, please feel free to contact me at any time.      Dictated By Jay Hammonds.  Ruth Saul MD    Problem List  Principal Problem:    Perforation bowel [facility])  Active Problems:    Bowel perforation ([facility] [address]    Rect -      NEUROLOGICAL FINDINGS         NONE              ACTIVITY TOLERANCE      resting /55 HR 76  O2 sats 97 %   After activity BP  136/68  HR 84 O2 sats 99 %              AM-PAC '6-Clicks' INPATIENT SHORT FORM - BASIC MOBILITY  How much difficulty d Patient will negotiate 4  stairs/one curb w/ assistive device and supervision   Goal #4   Current Status    Goal #5 Patient and family education / training for improved safety and ability with fxn mobility tasks for possible  d/c to home    Goal #5   Sarabjit

## 2018-10-31 NOTE — CONSULTS
[facility]    PATIENT'S NAME: Marquita Bennett   ATTENDING PHYSICIAN: Cathie Cat MD   CONSULTING PHYSICIAN: Lissette Montes.  Sudhir Cabello MD   PATIENT ACCOUNT#:   [id number]    [facility] [address] [facility]  RECORD #:   [id number]       DATE OF B adenocarcinoma. A CT scan of the chest was done to complete the workup and showed an incidental solid pulmonary nodule as well as changes consistent with COPD.   The patient saw Dr. Amy Cano and discussed the possibility of surgery, but the patient was u recent diagnosis of metastatic and locally advanced colorectal malignancy. She is not a surgical or chemotherapeutic candidate based upon her advanced age. RECOMMENDATIONS:  I do believe the patient is an excellent candidate for palliative radiation. free to contact me at any time. Dictated By Abdullahi Ch Ma, MD  d: 10/30/2018 17:45:07  t: 10/30/2018 18:21:19  Gladis Pizano [id number]  NAD/    cc: MD Cristian Fisher MD Cipriano Carte, MD Jaime Sosa.  Kacy Klein

## 2018-10-31 NOTE — PROGRESS NOTES
[facility]    Progress Note    Marilyn Fuentes Patient Status:  Inpatient    1926 MRN [id number]   Location [facility] 4W/SW/SE Attending Benita Loving MD   Hosp Day # 5 PCP Rico Jo       Subjective:    Marilyn Fuentes Q8H [facility]   • docusate sodium  100 mg Oral BID       Current PRN Inpatient Meds:      Normal Saline Flush, acetaminophen, acetaminophen **OR** HYDROcodone-acetaminophen **OR** HYDROcodone-acetaminophen, morphINE sulfate **OR** morphINE sulfate **OR** loiIN 2:53 PM   Result Value Ref Range    Urine Culture No Growth at 18-24 hrs.  N/A       Imaging/EKG:           Assessment and Plan:   Rectal mass / adenocarcinoma  -s/p flex sig with biopsies  -tolerating diet  -rectal cancer with mets to liver  -CT chest with

## 2018-10-31 NOTE — CM/SW NOTE
STEPHEN received a call from Dr. Nunu Dickson regarding possible rehab options for the pt. STEPHEN met with the pt's dtr. Larisa Logan regarding rehab. [id number] Stephan Logan is interested in rehab for the pt. As it would buy time for her and her sister to hire a caregiver. The pt.  Is v

## 2018-11-01 NOTE — PHYSICAL THERAPY NOTE
PHYSICAL THERAPY TREATMENT NOTE - INPATIENT     Room Number: 447B/447-B       Presenting Problem: adenocarcinoma  metastatic rectal CA   perforated bowel     weakness     Problem List  Principal Problem:    Perforation bowel ([address]  Active Problems:    Herlinda fall prevention also due to pt poor endurance. Therapy does recommend [place] [facility] for d/c to home  . Pt will need a perry RW for d/c to home.     Pt family states they have transport chair / bars for toliet and also bed rail for increase ease with bed mobility AM-PAC Score:  Raw Score: 16   PT Approx Degree of Impairment Score: 54.16%   Standardized Score (AM-PAC Scale): 40.78   CMS Modifier (G-Code): CK    FUNCTIONAL ABILITY STATUS  Gait Assessment   Gait Assistance: Minimum assistance  Distance (ft):  10 f

## 2018-11-01 NOTE — PROGRESS NOTES
[facility]    Progress Note    Sebastian Lewis Patient Status:  Inpatient    1926 MRN [id number]   Location [facility] 4W/SW/SE Attending Carter Ivy MD   [facility] Day # 6 PCP Kyaw Greenwood       Subjective:    Sebastian Lewis HYDROcodone-acetaminophen **OR** HYDROcodone-acetaminophen, morphINE sulfate **OR** morphINE sulfate **OR** morphINE sulfate, ondansetron HCl, bisacodyl    Results:     Lab Results   Component Value Date    WBC 8.7 11/01/2018    HGB 8.7 (L) 11/01/2018    H / adenocarcinoma  -s/p flex sig with biopsies  -tolerating diet  -rectal cancer with mets to liver  -CT chest with ?nodule  -GI and Surgery following  -appreciate oncology input  -Rad/Onc evaluated, deemed pt appropriate for palliative xrt, will start as O

## 2018-11-02 NOTE — DISCHARGE SUMMARY
[facility]    Discharge Summary    Edis Cho Patient Status:  Inpatient    1926 MRN [id number]   Location [facility] 4W/SW/SE Attending Elvin Alaniz MD   Hosp Day # 7 PCP Shantell Yuen     Date of Admission: 10/2 necrotic cervical cancer versus necrotic colon cancer with adjacent contained perforation. There are at least 2 hepatic metastases. There is a large hiatal hernia and left inguinal hernia, without obstruction.   The patient was started on IV Zosyn and she medications  · Ciprofloxacin HCl 250 MG Tabs  · ferrous sulfate 325 (65 FE) MG Tbec       Follow-up With  Details  Why  Contact Info   Abimbola Garcia  In 1 week    [address] [facility] [place] [address] [phone]   Beti Lopez MD  In

## 2018-11-02 NOTE — CM/SW NOTE
MD orders received regarding HHC. Referral has been sent to [facility]. [facility] is aware of MD orders and discharge plan for today 11/2. The pt's dtr. Sinan Fox has the caregiver list and SNF list for resources if needed.     KEVON Pat

## 2018-11-02 NOTE — PLAN OF CARE
DISCHARGE PLANNING    • Discharge to home or other facility with appropriate resources Progressing    Home with Gerry Tobias to follow    cat is plannig discharge home today- reviewed discharge instructions, new meds- ferrous sulfate and cipro- HHC will follow pat

## 2018-11-15 NOTE — PROGRESS NOTES
Oncology Nutrition Assessment    Ht Readings from Last 1 Encounters:  10/27/18 : 152.4 cm (5')      Wt Readings from Last 1 Encounters:  10/27/18 : 47.6 kg (105 lb)    BMI Calculated: There is no height or weight on file to calculate BMI.   Weight History:

## 2018-11-20 NOTE — PROGRESS NOTES
[facility] Radiation Treatment Management Note 1-5    Patient: Anushka Reed  Age:  80year old  Visit Diagnosis:    1.  Rectal cancer ([address]      Primary Rad/Onc:  Dr. Kailash Mullins Pelaez    Site Delivered Dose (Gy) Prescribed Dose (Gy) Ronen Gamboa

## 2018-11-25 PROBLEM — N30.00 ACUTE CYSTITIS WITHOUT HEMATURIA: Status: ACTIVE | Noted: 2018-01-01

## 2018-11-25 PROBLEM — R55 SYNCOPE, NEAR: Status: ACTIVE | Noted: 2018-01-01

## 2018-11-25 NOTE — ED INITIAL ASSESSMENT (HPI)
Patient presents via EMS for c/o weakness, possible dehydration    Patient has Colon CA, patient currently undergoing radiation, next tx tomorrow

## 2018-11-25 NOTE — CM/SW NOTE
SW attempted to call pt's dtr, but received no answer. Pt was recently d/c on 11/2 w/ [facility]. During last hospitalization, pt/dtr were provided w/ caregiver list and SNF list. Family was agreeable for pt to go to SNF, but pt had declined.  PT/OT to

## 2018-11-25 NOTE — ED NOTES
Orders for admission, patient is aware of plan and ready to go upstairs. Any questions, please call ED RN Ivan Mosley  at extension [phone]. Hospitalist at the bedside.

## 2018-11-25 NOTE — H&P
[address] [place] [address] Road  : 1926    Status: Emergency  Day #: 0    Attending: Jacqueline Sherman MD  PCP: Fany Dixon     Date of Encounter:  2018  Date of Admission:  2018     Chief Complaint: Roya Rowan (119-135)/(62-72) 123/65  Gen: A+Ox3. No distress. [place]. HEENT: NCAT, neck supple, no carotid bruit. CV: RRR, S1S2, and intact distal pulses. No gallop, rub, murmur. Pulm: Effort and breath sounds normal. No distress, wheezes, rales, rhonchi.   Abd: S [place]akness  - secondary to infection/dehydration  - IVF's.   - PT/OT  - Check TSH    H/o rectal adenocarcinoma  - outpt XRT. Brittni Carmona for this. Chronic macrocytic anemia  - likely ACD  - Check Iron panel and B12/Folate level.        D

## 2018-11-25 NOTE — ED PROVIDER NOTES
Patient Seen in: [facility] 4w/sw/se    History   Patient presents with:  Dehydration (metabolic/constitutional)    Stated Complaint: dehydration    HPI    49-year-old female presents for complaint of near syncopal episode this morning.   Upon Lois Sultana °C) (Oral)   Resp 18   Ht 121.9 cm (4')   Wt 45.4 kg   SpO2 99%   BMI 30.52 kg/m²         Physical Exam   Constitutional: She is oriented to person, place, and time. She appears well-developed and well-nourished.    HENT:   Head: Normocephalic and atraumati within normal limits   MAGNESIUM - Normal   TROPONIN I - Normal   TROPONIN I - Normal   CBC WITH DIFFERENTIAL WITH PLATELET    Narrative: The following orders were created for panel order CBC WITH DIFFERENTIAL WITH PLATELET.   Procedure effacement of the basal cisterns is appreciated. There is no extra-axial fluid collection. CEREBRUM: No acute intraparenchymal hemorrhage, edema, or cortical sulcal effacement is apparent.  There is no space-occupying lesion, mass effect, or shift of midlin 11/25/2018  PROCEDURE: XR CHEST AP PORTABLE (CPT=71010) TIME: 1117. COMPARISON: [facility], CT CHEST(CONTRAST ONLY) (CPT=71260), 10/28/2018, 10:52. INDICATIONS: Transient alteration of awareness. Focal psychomotor deficit.  History of col Prescribed:  Current Discharge Medication List        Present on Admission  Date Reviewed: 9/5/2017          ICD-10-CM Noted POA    * (Principal) Acute cystitis without hematuria N30.00 11/25/2018 Unknown    Syncope, near R55 11/25/2018

## 2018-11-26 NOTE — PHYSICAL THERAPY NOTE
PHYSICAL THERAPY EVALUATION - INPATIENT     Room Number: 463/463-A  Evaluation Date: 11/26/2018  Type of Evaluation: Initial   Physician Order: PT Eval and Treat    Presenting Problem: acute cysitis without hematuria, history of colon cancer  Reason for T Laterality Date   • APPENDECTOMY     • FLEXIBLE SIGMOIDOSCOPY N/A 10/27/2018    Performed by Tanner Bauer MD at [address] [facility]  Type of Home: House   Home Layout: One level  Stairs to Enter : 4  Railing CK    FUNCTIONAL ABILITY STATUS  Gait Assessment   Gait Assistance: Contact guard assist(SBA to CGA, has CGA when home)  Distance (ft): 10ft x 1 20ft x 1  Assistive Device: Rolling walker  Pattern: Within Functional Limits  Stoop/Curb Assistance: Not teste

## 2018-11-26 NOTE — PROGRESS NOTES
[facility]    Progress Note    Willa Dillard Patient Status:  Inpatient    1926 MRN [id number]   Location [facility] 4W/SW/SE Attending Leandro Cerda MD   Hosp Day # 0 PCP Petra Sosa       Subjective:    Willa Dillard i Scheduled Inpatient Meds:     • potassium chloride  40 mEq Oral Once   • Vancomycin HCl  125 mg Oral Daily   • Heparin Sodium (Porcine)  5,000 Units Subcutaneous Q8H [address]   • cefTRIAXone  1 g Intravenous Q24H   • iron sucrose  200 mg Intravenous Q24H   • Vi 12-lead    Result Date: 11/25/2018  ECG Report  Interpretation  -------------------------- Sinus Rhythm Low voltage in limb leads.  -Incomplete left bundle branch block.  -Poor R-wave progression -may be secondary to conduction defect consider old anterior

## 2018-11-26 NOTE — PROGRESS NOTES
[facility] Pharmacy Note:  Renal Dose Adjustment for Metoclopramide (REGLAN)    Santiago Rich has been prescribed Metoclopramide (REGLAN) 10 mg every 8 hours. Estimated Creatinine Clearance: 38.4 mL/min (based on SCr of 0.67 mg/dL).     Her calculated creatini

## 2018-11-26 NOTE — OCCUPATIONAL THERAPY NOTE
OCCUPATIONAL THERAPY EVALUATION - INPATIENT     Room Number: 463/463-A  Evaluation Date: 11/26/2018  Type of Evaluation: Initial       Physician Order: IP Consult to Occupational Therapy  Reason for Therapy: ADL/IADL Dysfunction and Discharge Planning of Home: House  Home Layout: One level  Lives With: Caregiver 24 hours     Toilet and Equipment: Comfort height toilet  Shower/Tub and Equipment: (Sponge bathe with help of caregiver)             Drives: No  Patient Regularly Uses: None    Stairs in Home: ASSESSMENT  Static Sitting: fair+  Dynamic Sitting: fair  Static Standing: fair  Dynamic Standing: fair-    FUNCTIONAL ADL ASSESSMENT  Grooming: setup  Feeding: setup  Bathing: NT  Toileting:  Mod A   Upper Extremity Dressing: NT  Lower Extremity Dressing:

## 2018-11-26 NOTE — PLAN OF CARE
DISCHARGE PLANNING    • Discharge to home or other facility with appropriate resources Progressing        Patient Centered Care    • Patient preferences are identified and integrated in the patient's plan of care Progressing        RISK FOR INFECTION - ALBERTA

## 2018-11-27 NOTE — CONSULTS
[facility]    Report of Consultation    Giancarlo Mendieta Patient Status:  Inpatient    1926 MRN [id number]   Location [facility] 4W/SW/SE Attending Saima Saba MD   Hosp Day # 1 PCP Carol Matos     Date of Admission:   Units 5,000 Units Subcutaneous Q8H [facility]   acetaminophen (TYLENOL) tab 650 mg 650 mg Oral Q6H PRN   ondansetron HCl (ZOFRAN) injection 4 mg 4 mg Intravenous Q6H PRN   CefTRIAXone Sodium (ROCEPHIN) 1 g in sodium chloride 0.9 % 100 mL MBP/ADD-vantage 1 g Intra atraumatic, no cyanosis or edema    Results:     Laboratory Data:  Lab Results   Component Value Date    WBC 7.1 11/27/2018    HGB 7.8 (L) 11/27/2018    HCT 23.1 (L) 11/27/2018     (L) 11/27/2018    CREATSERUM 0.68 11/27/2018    BUN 7 (L) 11/27/2018 chambers. Chronic appearing organoaxial gastric volvulus without dilation. Hypervascular lesion within the left hepatic lobe is again seen and incompletely characterized.  Dedicated liver MRI (or liver CT if patient cannot tolerate MRI) could further evalu for one dose lasix IV  - aldactone daily starting tomorrow 12.5 daily it will decrease readmission and improve volume status  - keep fluid to 1.5 L daily     Thank you for allowing me to participate in the care of your patient.     Davon Mak  11/27/2018

## 2018-11-27 NOTE — CM/SW NOTE
11/27/18 1500   CM/SW Referral Data   Referral Source    Reason for Referral Readmission   Informant Children   Readmission Assessment   Factors that patient feels contributed to this readmission (dizziness,dehydration)   Pt. received educat

## 2018-11-27 NOTE — CM/SW NOTE
Pt has been screened per chart review and during nursing rounds. Pt is from home w/ 24 hr caregiver. SW confirmed that pt is current w/ [facility]. SW/CM will remain available for support and any discharge needs.      *Resume HHC orders needed prior to discharge*

## 2018-11-27 NOTE — PROGRESS NOTES
[facility]    Progress Note    Giancarlo Mendieta Patient Status:  Inpatient    1926 MRN [id number]   Location [facility] 4W/SW/SE Attending Saima Saba MD   Hosp Day # 1 PCP Carol Matos       Subjective:    Giancarlo Mendieta i deformity. There was full range of motion in all the extremities. EXTREMITIES: There was no edema, clubbing or cyanosis  NEUROLOGICAL:  There was no focal deficit. Cranial nerves intact. Strength 5/5. Assessed gait with walker felt dizzy.          Gina Sim (CST): Lance Dandy, MD on 11/25/2018 at 14:38     Approved by (CST): Lance Dandy, MD on 11/25/2018 at 14:40          Xr Chest Ap Portable  (cpt=71045)    Result Date: 11/25/2018  CONCLUSION:  Cardiomegaly and large hiatal hernia without further radio rectal adenocarcinoma  - outpt XRT. - Follows Dr. Wendy Aguilar for this.      Chronic macrocytic anemia  - likely ACD and JOSE.    - Venofer day 2/5   - check FOB   - Monitor H/H   - Baseline Hb 8-9            Quality:  · DVT Prophylaxis: heparin  · CODE stat

## 2018-11-27 NOTE — CONSULTS
[facility]    Report of Consultation    Josseline Christensen Patient Status:  Inpatient    1926 MRN [id number]   Location [facility] 4W/SW/SE Attending Abbe Erazo MD   Hosp Day # 1 GEORGE Andujar     Date of Admission:   Oral Once   Vancomycin HCl (FIRVANQ) 50 MG/ML oral solution 125 mg 125 mg Oral Daily   Metoclopramide HCl (REGLAN) injection 5 mg 5 mg Intravenous Q8H PRN   Normal Saline Flush 0.9 % injection 3 mL 3 mL Intravenous PRN   Atropine Sulfate 0.1 MG/ML injectio age    Language intact including: comprehension, naming, repetition, vocabulary    Cranial Nerves:  II.- Visual fields full to confrontation in the left eye, she is blind in the right eye        Fundoscopically- No papilledema or retinal hemorrhages.  Laura Mobley chronic microvascular ischemic disease. There is also large vessel atherosclerosis. 3. Lesser incidental findings as above.     Dictated by (CST): Alta Newell MD on 11/25/2018 at 14:38     Approved by (CST): Alta Newell MD on 11/25/2018 at 14:40

## 2018-11-27 NOTE — DIETARY NOTE
ADULT NUTRITION INITIAL ASSESSMENT    Pt is at moderate nutrition risk. Pt meets malnutrition criteria.       CRITERIA FOR MALNUTRITION DIAGNOSIS:  Criteria for non-severe malnutrition diagnosis: acute illness/injury related to wt loss 7.5% in 3 months, (degenerative joint disease). ANTHROPOMETRICS:  HT: 5'  WT: 45.4 kg (100 lb)   BMI: Body mass index is 19.53 kg/m².   BMI CLASSIFICATION: 19-24.9 kg/m2 - WNL  IBW: 100 Lbs        100 % IBW  Usual Body Wt: 115 Lbs       87% UBW    WEIGHT HISTORY:  Patient Fiber/Soft  Oral Supplements: 3 times daily as above  Estimated Nutritional Needs:  Calories: 4812-0183 calories/day (30-35 calories per kg Current wt)  Protein: 70 grams protein/day (1.5 grams protein per kg Current wt)    MONITOR AND EVALUATE/NUTRITION G

## 2018-11-28 NOTE — PROGRESS NOTES
[facility]    Progress Note    Kristen Acostat Patient Status:  Inpatient    1926 MRN [id number]   Location [facility] 4W/SW/SE Attending Kaila Bucio MD   Hosp Day # 2 PCP Moses Mendoza       Subjective:    Kristen Ronn is Dictated by (CST): Evelia Stanford MD on 11/28/2018 at 10:58     Approved by (CST): Evelia Stanford MD on 11/28/2018 at 11:02          Xr Chest Ap Portable  (cpt=71045)    Result Date: 11/27/2018  CONCLUSION:  1. Cardiomegaly.  Vascular congestion with mild d Pyridium   - Rocephin IV q24hrs  - d/c IVF's. - Monitor      Dizziness  - Pt describes peripheral vertigo. - MRI brain r/o post circulation CVA  - neuro consult  - Meclizine scheduled TID.   - PT/OT eval for vestibular rehab. - orthostatic V/S neg.

## 2018-11-28 NOTE — PROGRESS NOTES
[facility]  Neurology Progress Note    Melissa Isidro Patient Status:  Inpatient    1926 MRN [id number]   Location [facility] 4W/SW/SE Attending Blanca Arevalo MD   Hosp Day # 2 PCP James Kumar     Subjective:   Melissa Isidro is a(n 11/28/18  0513 11/28/18  0514   BP: 124/44 119/51 118/60 116/54   Pulse:   93    Resp: 18 18     Temp: 99.6 °F (37.6 °C) 98 °F (36.7 °C)     TempSrc: Oral Oral     SpO2:  92%     Weight:  101 lb     Height:           General: No apparent distress, well nou congestive failure and this corresponds to appearance on today's CT study. Findings have developed since prior study of November 25, 2018. 2. Large compound hiatal hernia with known partial herniation of the stomach and colon without apparent obstruction. MRI of the brain since it is unlikely to be a stroke. We will sign off.     Davide Avalos  11/28/2018  9:11 AM

## 2018-11-28 NOTE — PROGRESS NOTES
Below note from [facility]. Patient was interviewed and examined. Agree with assessment and plan with edits to the document performed as necessary.     [facility]  Progress Note    Saumya Clinton Patient Status:  Inpatient    1926 MRN [id number] Laboratory/Data:  Diagnostics:     Echo 11/26/18  Study Conclusions  1. Left ventricle: The cavity size was normal. Wall thickness was     increased in a pattern of mild LVH. There was mild focal basal     hypertrophy of the septum.  Systolic function w 11/28/2018    RBC 2.29 11/28/2018    HGB 7.8 11/28/2018    HGB 7.8 11/28/2018    HCT 23.2 11/28/2018    HCT 23.2 11/28/2018    .3 11/28/2018    MCH 33.9 11/28/2018    MCHC 33.5 11/28/2018    RDW 18.3 11/28/2018     11/28/2018    MPV 8.9 11/28 Assessment and Plan:    1. Dizziness  - thought to be vertigo as is triggered by moving and lasts for short period of time  - meclizine 12.5 mg TID  - orthostatic Bps negative    2. Mild- moderate AS    3.  Acute Diastolic CHF  - , grade 2 mata

## 2018-11-29 NOTE — CM/SW NOTE
STEPHEN spoke with the pt's dtr. Who stated she is working on getting the pt's caregiver back into place. The pt's dtr. Stated she will have her caregiver set up for Saturday. STEPHEN notified the pt's nurse of the above who will notify the pt's dtr.       Chan Kee

## 2018-11-29 NOTE — PROGRESS NOTES
Patient seen in follow up. Patient denies any chest pain or sob. Dizziness. Positional L chest pain now gone.     11/29/18  1302   BP: 110/51   Pulse: 93   Resp:    Temp:        Intake/Output Summary (Last 24 hours) at 11/29/2018 1322  Last data filed ferrous sulfate 325 (65 FE) MG Oral Tab EC Take 1 tablet (325 mg total) by mouth daily with breakfast.   Cyanocobalamin (VITAMIN B 12 OR) Take 1,000 mg by mouth daily. Vitamin D3 2000 units Oral Cap Take 2,000 Units by mouth daily.      Xr Chest Ap Portab

## 2018-11-29 NOTE — PHYSICAL THERAPY NOTE
PHYSICAL THERAPY EVALUATION - INPATIENT     Room Number: 463/463-A  Evaluation Date: 11/29/2018  Type of Evaluation: Re-evaluation   Physician Order: PT Eval and Treat(re-eval)    Presenting Problem: acute cysitis without hematuria, history of colon cance Date   • DJD (degenerative joint disease)        Past Surgical History  Past Surgical History:   Procedure Laterality Date   • APPENDECTOMY     • FLEXIBLE SIGMOIDOSCOPY N/A 10/27/2018    Performed by Romana Sandra MD at [address] [place] [address] over in bed (including adjusting bedclothes, sheets and blankets)?: A Little   -   Sitting down on and standing up from a chair with arms (e.g., wheelchair, bedside commode, etc.): A Little   -   Moving from lying on back to sitting on the side of the bed? able to ambulate 100 feet with assist device: walker - rolling at assistance level: minimum assistance   Goal #3   Current Status NT   Goal #4 Patient will negotiate 5 stairs/one curb w/ assistive device and supervision   Goal #4   Current Status NT   Goal

## 2018-11-29 NOTE — OCCUPATIONAL THERAPY NOTE
OCCUPATIONAL THERAPY EVALUATION - INPATIENT     Room Number: 463/463-A  Evaluation Date: 11/29/2018  Type of Evaluation: Initial  Presenting Problem: (dizziness)    Physician Order: IP Consult to Occupational Therapy  Reason for Therapy: ADL/IADL Dysfuncti joint disease)        Past Surgical History  Past Surgical History:   Procedure Laterality Date   • APPENDECTOMY     • FLEXIBLE SIGMOIDOSCOPY N/A 10/27/2018    Performed by Ryan Rodriguez MD at [place] [address]462 mobiltiy using R/W with flexed posture    BALANCE ASSESSMENT  Static Sitting: SBA  Dynamic Sitting: SBA  FUNCTIONAL ADL ASSESSMENT  Grooming: CGA standing at sink    Toileting: CGA  Lower Extremity Dressing: set up and CGA  Patient End of Session: With [facility]

## 2018-11-30 NOTE — OCCUPATIONAL THERAPY NOTE
OCCUPATIONAL THERAPY TREATMENT NOTE - INPATIENT        Room Number: 463/463-A           Presenting Problem: (dizziness)    Problem List  Principal Problem:    Acute cystitis without hematuria  Active Problems:    Syncope, near    Vertigo      ASSESSMENT TOLERANCE  BP - orthostatic   Supine 101/49  Sitting 110/48  Stand 121/57                         O2 SATURATIONS   on RA  96%             ACTIVITIES OF DAILY LIVING ASSESSMENT  AM-PAC ‘6-Clicks’ Inpatient Daily Activity Short Form  How much help from anoth

## 2018-11-30 NOTE — PROGRESS NOTES
Patient seen in follow up. Patient denies any chest pain or sob. Dizziness.  No CP.   11/30/18  1206   BP: 116/54   Pulse: 96   Resp: 18   Temp: 98.2 °F (36.8 °C)       Intake/Output Summary (Last 24 hours) at 11/30/2018 1447  Last data filed at 11/30 Vitamin D3 2000 units Oral Cap Take 2,000 Units by mouth daily.            Lab Results   Component Value Date    WBC 7.2 11/30/2018    HGB 7.9 11/30/2018    HCT 23.6 11/30/2018     11/30/2018    CREATSERUM 0.80 11/30/2018    BUN 8 11/30/2018    NA 13

## 2018-11-30 NOTE — PHYSICAL THERAPY NOTE
PHYSICAL THERAPY TREATMENT NOTE - INPATIENT     Room Number: 463/463-A       Presenting Problem: acute cysitis without hematuria, history of colon cancer    Problem List  Principal Problem:    Acute cystitis without hematuria  Active Problems:    Syncope, currently have. ..  -   Turning over in bed (including adjusting bedclothes, sheets and blankets)?: A Little   -   Sitting down on and standing up from a chair with arms (e.g., wheelchair, bedside commode, etc.): A Little   -   Moving from lying on back to Goal #4   Current Status NT   Goal #5 Patient to demonstrate independence with home activity/exercise instructions provided to patient in preparation for discharge.    Goal #5   Current Status  in progress   Goal #6     Goal #6  Current Status

## 2018-11-30 NOTE — PROGRESS NOTES
[facility]    Progress Note    Sebastian Lewis Patient Status:  Inpatient    1926 MRN [id number]   Location [facility] 4W/SW/SE Attending Jung Alva MD   Hosp Day # 3 PCP Kyaw Greenwood       Subjective:    Sebastian Lewis is MD on 11/28/2018 at 10:58     Approved by (CST): Evelia Stanford MD on 11/28/2018 at 11:02          Xr Chest Ap Portable  (cpt=71045)    Result Date: 11/27/2018  CONCLUSION:  1. Cardiomegaly.  Vascular congestion with mild diffuse interstitial infiltration/e Oral Once   • Vancomycin HCl  125 mg Oral Daily   • Heparin Sodium (Porcine)  5,000 Units Subcutaneous Q8H [address]   • cefTRIAXone  1 g Intravenous Q24H   • iron sucrose  200 mg Intravenous Q24H   • Vitamin B-12  1,000 mcg Oral Daily     ipratropium-albuterol,

## 2018-11-30 NOTE — PROGRESS NOTES
[facility]    Progress Note    Justin Dodson Patient Status:  Inpatient    1926 MRN [id number]   Location [facility] 4W/SW/SE Attending Herrera Werner MD   Hosp Day # 4 PCP Yael Farias       Subjective:    Justin Dodson is on 11/28/2018 at 10:58     Approved by (CST): Anselmo Barthel, MD on 11/28/2018 at 11:02          Xr Chest Ap Portable  (cpt=71045)    Result Date: 11/27/2018  CONCLUSION:  1. Cardiomegaly.  Vascular congestion with mild diffuse interstitial infiltration/jonathan Oral Daily   • potassium chloride  40 mEq Oral Once   • Vancomycin HCl  125 mg Oral Daily   • Heparin Sodium (Porcine)  5,000 Units Subcutaneous Q8H [address]   • cefTRIAXone  1 g Intravenous Q24H   • Vitamin B-12  1,000 mcg Oral Daily     ipratropium-albuterol,

## 2018-12-01 NOTE — PLAN OF CARE
DISCHARGE PLANNING    • Discharge to home or other facility with appropriate resources Progressing        Patient Centered Care    • Patient preferences are identified and integrated in the patient's plan of care [address]    •

## 2018-12-01 NOTE — CM/SW NOTE
MDO received for home health services to be resumed at dc. Pt is already current with [facility] Liaison/Alecia [id number] is aware of the orders and potential dc to home today.     BOB Nolasco, [address] [place] [address]

## 2018-12-01 NOTE — PROGRESS NOTES
[facility]    Progress Note    Haily Kowalski Patient Status:  Inpatient    1926 MRN [id number]   Location [facility] 4W/SW/SE Attending Nicolas Bacon MD   Hosp Day # 5 PCP Ellie Cooney       Subjective:    Haily Kowalski is - repeat UA and urine cx due to positive blood cx 3 days ago  - repeat blood cx   - Pyridium   - d/c IVF's today   - Monitor      Dizziness  - Pt describes peripheral vertigo.    - also secondary to orthostatic hypotension  - heplock IV- recheck orthostat

## 2018-12-01 NOTE — PROGRESS NOTES
[facility] AND [facility]  Progress Note    Sebastian Lewis Patient Status:  Inpatient    1926 MRN [id number]   Location [facility] 4W/SW/SE Attending Jung Alva MD   Hosp Day # 5 PCP EMA ZUÑIGA     Subjective:  Patient seen in follow up.   She lungs.  2. Probable small residual left basilar pleural effusion. Right basilar pleural effusion no longer detected. 3. Stable cardiomegaly with normal pulmonary vascularity. 4. Atherosclerotic calcification aorta. Echo 11/26/18  Study Conclusions  1. 12/01/2018     12/01/2018    MPV 8.7 12/01/2018       Glucose 70 - 99 mg/dL 86    Sodium 136 - 144 mmol/L 135 Abnormally low     Potassium 3.3 - 5.1 mmol/L 4.2    Chloride 95 - 110 mmol/L 105    CO2 22 - 32 mmol/L 27    BUN 8 - 20 mg/dL 6 Abnormally [facility]  12/1/2018  11:36 AM

## 2018-12-01 NOTE — HOME CARE LIAISON
Patient is current with [facility]. Orders received to resume services. Residential staff notified to resume care upon discharge. Verified with patient's nurse, discharge is on hold today.

## 2018-12-02 NOTE — PROGRESS NOTES
[facility]    Progress Note    Alberta Pisano Patient Status:  Inpatient    1926 MRN [id number]   Location [facility] 4W/SW/SE Attending Dimas Delcid MD   Hosp Day # 6 PCP Miryam Cardenas       Subjective:    Alberta Pisano is William Ontiveros MD on 12/02/2018 at 9:02                 • piperacillin-tazobactam  3.375 g Intravenous Q8H   • Meclizine HCl  12.5 mg Oral TID   • spironolactone  12.5 mg Oral Daily   • potassium chloride  40 mEq Oral Once   • Vancomycin HCl  125 mg O          Quality:  · DVT Prophylaxis: heparin  · CODE status: full. Greater then 35 minutes spent.     Roxana Haider MD

## 2018-12-02 NOTE — PROGRESS NOTES
Below note from [facility]. Agree with assessment and plan with edits to the document performed as necessary.   [facility] AND [facility]  Progress Note    Giancarlo Mendieta Patient Status:  Inpatient    1926 MRN [id number]   Location [facility] 4W/SW/ aorta.  2. Massive retrocardiac hiatal hernia has increased. 3. Superimposed bibasilar atelectatic changes and small effusions. 4. Chronic perihilar pulmonary markings. No acute pulmonary consolidation    CXR 11/28/18  CONCLUSION:   1.  Large retrocardiac block.   -Poor R-wave progression -may be secondary to conduction defect consider old anterior infarct.   ABNORMAL    Labs:       Glucose 70 - 99 mg/dL 86    Sodium 136 - 144 mmol/L 135 Abnormally low     Potassium 3.3 - 5.1 mmol/L 4.2    Chloride 95 - 110 0. 83    4. Rectal adenocarcinoma    5. UTI    Plan: discharge pending repeat cultures.     Carroll Melodie Andreea R [address] [facility]  [phone]

## 2018-12-03 NOTE — PROGRESS NOTES
[facility]    Progress Note    Silvio Carr Patient Status:  Inpatient    1926 MRN [id number]   [facility] 4W/SW/SE Attending Daryn Soto MD   Hosp Day # 7 PCP Lamar Ruiz       Subjective:    Silvio Carr is MD MAUREEN on 12/02/2018 at 9:02                 • piperacillin-tazobactam  3.375 g Intravenous Q8H   • Meclizine HCl  12.5 mg Oral TID   • spironolactone  12.5 mg Oral Daily   • potassium chloride  40 mEq Oral Once   • Vancomycin HCl  125 mg Oral Daily   • Hep then 35 minutes spent.  Discharge home with home health once cleared by Charlotte Membreno MD

## 2018-12-03 NOTE — OCCUPATIONAL THERAPY NOTE
OCCUPATIONAL THERAPY TREATMENT NOTE - INPATIENT        Room Number: 463/463-A           Presenting Problem: (dizziness)    Problem List  Principal Problem:    Acute cystitis without hematuria  Active Problems:    Syncope, near    Vertigo      ASSESSMENT A Little  -   Putting on and taking off regular upper body clothing?: A Little  -   Taking care of personal grooming such as brushing teeth?: None  -   Eating meals?: None    AM-PAC Score:  Score: 20  Approx Degree of Impairment: 38.32%  Standardized Score

## 2018-12-03 NOTE — PLAN OF CARE
Problem: Patient Centered Care  Goal: Patient preferences are identified and integrated in the patient's plan of care  Interventions:  - What would you like us to know as we care for you?  I do everything myself at home  - Provide timely, complete, and accu to home or other facility with appropriate resources  INTERVENTIONS:  - Identify barriers to discharge w/pt and caregiver  - Include patient/family/discharge partner in discharge planning  - Arrange for needed discharge resources and transportation as appr

## 2018-12-03 NOTE — PHYSICAL THERAPY NOTE
PHYSICAL THERAPY TREATMENT NOTE - INPATIENT     Room Number: 463/463-A       Presenting Problem: acute cysitis without hematuria, history of colon cancer    Problem List  Principal Problem:    Acute cystitis without hematuria  Active Problems:    Syncope, mechanics;Breathing techniques;Relaxation;Repositioning    BALANCE                                                                                                                     Static Sitting: Good  Dynamic Sitting: Fair +           Static Standing: walker - rolling      Goal #2  Current Status SBA with rw   Goal #3 Patient is able to ambulate 100 feet with assist device: walker - rolling at assistance level: minimum assistance   Goal #3   Current Status 20ft, 35ft with RW CG   Goal #4 Patient will ne

## 2018-12-03 NOTE — PROGRESS NOTES
[facility] AND [facility]  Progress Note    Willa Dillard Patient Status:  Inpatient    1926 MRN [id number]   Location [facility] 4W/SW/SE Attending Zahra Leonardo MD   Hosp Day # 7 PCP EMA ZUÑIGA     Subjective:  No SOB    Objective:  VS stab normal pulmonary vascularity. 4. Atherosclerotic calcification aorta. Echo 11/26/18  Study Conclusions  1. Left ventricle: The cavity size was normal. Wall thickness was     increased in a pattern of mild LVH.  There was mild focal basal     hypertrophy Gap 0 - 18 mmol/L 3    Calculated Osmolality 275 - 295 mOsm/kg 277    GFR, Non- >=60 >60    GFR, -American >=60 >60      Medications:    Current Facility-Administered Medications:  Piperacillin Sod-Tazobactam So (ZOSYN) 3.375 g in de Group  Pager: [phone]  Tel: [phone]

## 2018-12-03 NOTE — CM/SW NOTE
CTL progression of care note:  Pt is from home and her plan is to discharge to home with 24 hour caregiver. [facility] is current with Arsalan Caicedo 1. Order placed for ID consult per Dr. Juarez Ross.  CTL/SW to remain available for discharge planning and

## 2018-12-04 NOTE — DISCHARGE SUMMARY
[facility] - [facility]    Discharge Summary    Anushka Reed Patient Status:  Inpatient    1926 MRN [id number]   Location [facility] 4W/SW/SE Attending Omaira Wade MD   Psychiatric Day # 8 PCP Johnathan Mathews     Date of Admission: 2018 already received 3-4 days of IV ceftriaxone   - ID consult  - discontinue antibiotics  - fevers could be reactive secondary to her cancer  - I had a long talk with the daughters about palliative care- they said they will think about it  And expressed under Unchanged    ferrous sulfate 325 (65 FE) MG Oral Tab EC  Take 1 tablet (325 mg total) by mouth daily with breakfast.    Cyanocobalamin (VITAMIN B 12 OR)  Take 1,000 mg by mouth daily. Vitamin D3 2000 units Oral Cap  Take 2,000 Units by mouth daily. strawberry or ensure clear berry) PER DAY AT HOME        Time spent:  > 30 minutes    Karie Urbina  12/4/2018

## 2018-12-04 NOTE — CONSULTS
INFECTIOUS DISEASE CONSULT NOTE    Josseline Christensen Patient Status:  Inpatient    1926 MRN [id number]   Location [facility] 4W/SW/SE Attending Mahogany Chaidez MD   Hosp Day # 7 PCP EMA does not drink alcohol or use drugs.     Allergies:  No Known Allergies    Medications:    Current Facility-Administered Medications:   •  Piperacillin Sod-Tazobactam So (ZOSYN) 3.375 g in dextrose 5 % 100 mL ADD-vantage, 3.375 g, Intravenous, Q8H  •  0.9% pressure 104/44, pulse 90, temperature 99.3 °F (37.4 °C), temperature source Oral, resp. rate 18, height 121.9 cm (4'), weight 100 lb 6.4 oz (45.5 kg), SpO2 94 %.     General: Alert, oriented, NAD; thin female; appears younger than stated age  [de-identified]: [de-identified] Developed SOB 11/27 and CT PE w/o PE but finding of bilateral lower lobe opacities that were increased - atelectasis vs pneumonia. Azithromycin added. Now has completed 7 days of ceftriaxone. Tm 99.3.  Zosyn started 12/2 for UTI but UCx negative and denies

## 2018-12-04 NOTE — PROGRESS NOTES
[facility]  Progress Note    Haily Kowalski Patient Status:  Inpatient    1926 MRN [id number]   Location [facility] 4W/SW/SE Attending Nicolas Bacon MD   Hosp Day # 8 PCP EMA ZUÑIGA     Subjective:  No SOB    Objective:  VS stab with normal pulmonary vascularity. 4. Atherosclerotic calcification aorta. Echo 11/26/18  Study Conclusions  1. Left ventricle: The cavity size was normal. Wall thickness was     increased in a pattern of mild LVH.  There was mild focal basal     hypert Potassium 3.3 - 5.1 mmol/L 4.2    Chloride 95 - 110 mmol/L 105    CO2 22 - 32 mmol/L 27    BUN 8 - 20 mg/dL 6 Abnormally low     Creatinine 0.50 - 1.50 mg/dL 0.83    Calcium, Total 8.5 - 10.5 mg/dL 7.6 Abnormally low     BUN/CREA Ratio 10.0 - 20.0 7.2 Ab other medical issues         Thank you for allowing me to participate in the care of your patient. please call if you have any question.      Rosy Hughes MD   General Cardiology & Advanced Heart Failure, Cardiac Transplant and Assisted Devices  [facility]

## 2018-12-04 NOTE — CM/SW NOTE
RN informed STEPHEN that pt is medically stable to discharge today. STEPHEN notified Julián Seen from [facility] regarding pt's discharge. [facility] orders have been entered.      PLAN: Home w/ 24 hr caregiver & [address] [place] [address]

## 2018-12-05 NOTE — TELEPHONE ENCOUNTER
Ankita Dubon daughter stated pt was having a lot of dizziness this morning. I asked her if she scheduled a follow up appt with Dr. Angella Jacob yet and they have not asked to please make an appt and receive clearance from Dr. Angella Jacob to receive further RT.   Daughter

## 2018-12-05 NOTE — PROGRESS NOTES
INFECTIOUS DISEASE PROGRESS NOTE    Josh Mckinney Patient Status:  Inpatient    1926 MRN [id number]   Location [facility] 4W/SW/SE Attending No att. providers found   [facility] Day # 8 PCP EMA ZUÑIGA     Subjective:  No acute events.  Denies fo month later on 11/25 with complaints of dizziness at home, near syncope with some poor PO intake w/o fevers, chills, cough, SOB, abdominal pain, diarrhea. On admission febrile to 101.7 w/o leukocytosis. UA . UCx negative.  Admission BCx with GPR in 1

## 2018-12-10 NOTE — TELEPHONE ENCOUNTER
Ana Rolling from residential hospice calling to inform Dr Aleida Arora that Mazin Ramírez signed on to [facility] care on Friday 12/7. Nanine Castleman

## 2018-12-21 NOTE — PROGRESS NOTES
[facility]    PATIENT'S NAME: Irving Bartlett   RADIATION ONCOLOGIST: Giovanni Laurent.  Temo Gamble MD   PATIENT ACCOUNT #: [de-identified] LOCATION: [address] [place] [address] RECORD #: [id number] YOB: 1926   DATE: 11/21/2018       RADIATION ONCOLOG initially had been intended to receive 3000 cGy in 10 fractions as palliative treatment for her locally advanced rectal cancer. TOLERANCE:  The patient tolerated the radiation without issues or difficulty.   She was having no blood in her stool at the ti

## 2019-03-01 ENCOUNTER — TELEPHONE (OUTPATIENT)
Dept: HEMATOLOGY/ONCOLOGY | Facility: HOSPITAL | Age: 84
End: 2019-03-01

## 2019-03-01 NOTE — TELEPHONE ENCOUNTER
Magnus Moultrie from residential hospice calling to let Dr Ghada Roland know Tim Cordero passed away 2/28 at 1:30am. Family was present at time of death.  charlene

## (undated) DEVICE — FORCEP RADIAL JAW 4

## (undated) DEVICE — ENDOSCOPY PACK - LOWER: Brand: MEDLINE INDUSTRIES, INC.

## (undated) DEVICE — Device: Brand: DEFENDO AIR/WATER/SUCTION AND BIOPSY VALVE

## (undated) NOTE — LETTER
[facility]  Administration of Anesthesia  1. I, Ernestina Rosenthal, or _________________________________ acting on her behalf, (Patient) (Dependent/Representative) request to receive anesthesia for my pending procedure/operation/treatment.   A infections, high spinal block, spinal bleeding, seizure, cardiac arrest and death. 7. AWARENESS: I understand that it is possible (but unlikely) to have explicit memory of events from the operating room while under general anesthesia.   8. ELECTROCONVULSIV unconscious pt /Relationship    My signature below affirms that prior to the time of the procedure, I have explained to the patient and/or his/her guardian, the risks and benefits of undergoing anesthesia, as well as any reasonable alternatives.     _______

## (undated) NOTE — LETTER
[facility]  Administration of Anesthesia  1. I, Josh Mckinney, or _________________________________ acting on her behalf, (Patient) (Dependent/Representative) request to receive anesthesia for my pending procedure/operation/treatment.   A infections, high spinal block, spinal bleeding, seizure, cardiac arrest and death. 7. AWARENESS: I understand that it is possible (but unlikely) to have explicit memory of events from the operating room while under general anesthesia.   8. ELECTROCONVULSIV unconscious pt /Relationship    My signature below affirms that prior to the time of the procedure, I have explained to the patient and/or his/her guardian, the risks and benefits of undergoing anesthesia, as well as any reasonable alternatives.     _______

## (undated) NOTE — ED AVS SNAPSHOT
[facility] Emergency [facility]    [address]     [address] [place] [address]    Phone:  [phone]    Fax:  [phone]           Sebastian Lewis   MRN: [id number]    Department:  [facility]   Date of Visit:  2/5/2 and Class Registration line at [phone] or find a doctor online by visiting [email or web address].    IF THERE IS ANY CHANGE OR WORSENING OF YOUR CONDITION, CALL YOUR PRIMARY CARE PHYSICIAN AT ONCE OR RETURN IMMEDIATELY TO [address] [place] [address].     If

## (undated) NOTE — LETTER
[facility]  Administration of Anesthesia  1. I, Sebastian Lewis, or _________________________________ acting on her behalf, (Patient) (Dependent/Representative) request to receive anesthesia for my pending procedure/operation/treatment.   A infections, high spinal block, spinal bleeding, seizure, cardiac arrest and death. 7. AWARENESS: I understand that it is possible (but unlikely) to have explicit memory of events from the operating room while under general anesthesia.   8. ELECTROCONVULSIV unconscious pt /Relationship    My signature below affirms that prior to the time of the procedure, I have explained to the patient and/or his/her guardian, the risks and benefits of undergoing anesthesia, as well as any reasonable alternatives.     _______

## (undated) NOTE — LETTER
Hospital Discharge Documentation  Please phone to schedule a hospital follow up appointment.     From: [address] Hospitalist's Office  Phone: [phone]    Patient discharged time/date: 12/4/2018  4:30 PM  Patient discharge disposition:  Home or Self been having 3 days of dysuria. No F/C no CP no SOB no palpitations. She feels weak. Discharge Physical Exam:   Physical Exam:    General: No acute distress. Respiratory: Clear to auscultation bilaterally. No wheezes. No rhonchi.   Cardiovascular: S1, Meagan Mendoza MD Consulting Physician  INFECTIOUS DISEASES    [place] MD Braden Consulting Physician  Cardiovascular Diseases     Ivan Mckay MD Consulting Physician  NEUROLOGY          Pending Labs     Order Current Status    BLOOD CULTURE or nurse    Bring a paper prescription for each of these medications  · Meclizine HCl 12.5 MG Tabs  · spironolactone 25 MG Tabs         Follow up: Follow-up Information     Stephen Vazquez MD In 1 week.     Specialty:  Cardiovascular Diseases  Contact inf

## (undated) NOTE — LETTER
[facility]  Administration of Anesthesia  1. I, Giancarlo Mendieta, or _________________________________ acting on her behalf, (Patient) (Dependent/Representative) request to receive anesthesia for my pending procedure/operation/treatment.   A infections, high spinal block, spinal bleeding, seizure, cardiac arrest and death. 7. AWARENESS: I understand that it is possible (but unlikely) to have explicit memory of events from the operating room while under general anesthesia.   8. ELECTROCONVULSIV unconscious pt /Relationship    My signature below affirms that prior to the time of the procedure, I have explained to the patient and/or his/her guardian, the risks and benefits of undergoing anesthesia, as well as any reasonable alternatives.     _______

## (undated) NOTE — ED AVS SNAPSHOT
[facility] Emergency Department    [address]     [address] [place] [address]    Phone:  [phone]    Fax:  [phone]           Silvioortega Carr   MRN: [id number]    Department:  [facility] Emergency Department   Date of Visit:  2/5/2 indicado, llame al encargado de matilde al [phone]. It is our goal to assure that you are completely satisfied with every aspect of your visit today.   In an effort to constantly improve our service to you, we would appreciate any positive or negativ Any imaging studies and labs completed today can be reviewed in your MyChart account. You may have had testing done that requires us to contact you. Please make sure we have your correct phone number on file.       I certified that I have received a copy visit,  view other health information, and more. To sign up or find more information, go to [email or web address] and click on the Sign Up Now link in the [employer] box.      Enter your [employer] Activation Code exactly as it appears below along with yo

## (undated) NOTE — LETTER
Hospital Discharge Documentation  Please phone to schedule a hospital follow up appointment.     From: [address] Hospitalist's Office  Phone: [phone]    Patient discharged time/date: 11/2/2018  2:26 PM  Patient discharge disposition:  [address] HEENT:  Head was atraumatic and normocephalic. Eyes: Sclera was anicteric. Pupils were equal.   NECK:  Supple. There was no JVD.    CHEST:  Symmetrical movement on inspiration  LUNGS:  No audible wheezing  ABDOMEN: Non-distended  MUSCULOSKELETAL:  There GFRNAA  >60   < >  52*  51*  53*   CA  7.9*   < >  7.6*  7.6*  7.5*   ALB  2.3*   --    --    --    --    NA  133*   < >  135*  134*  136   K  3.8   < >  3.9  3.9  4.1   CL  99   < >  103  105  107   CO2  27   < >  27  25  24   ALKPHO  58   --    --    --